# Patient Record
Sex: MALE | Race: WHITE | NOT HISPANIC OR LATINO
[De-identification: names, ages, dates, MRNs, and addresses within clinical notes are randomized per-mention and may not be internally consistent; named-entity substitution may affect disease eponyms.]

---

## 2019-03-10 ENCOUNTER — TRANSCRIPTION ENCOUNTER (OUTPATIENT)
Age: 24
End: 2019-03-10

## 2019-07-13 ENCOUNTER — EMERGENCY (EMERGENCY)
Facility: HOSPITAL | Age: 24
LOS: 1 days | Discharge: HOME | End: 2019-07-13
Attending: EMERGENCY MEDICINE
Payer: COMMERCIAL

## 2019-07-13 VITALS
TEMPERATURE: 98 F | OXYGEN SATURATION: 96 % | DIASTOLIC BLOOD PRESSURE: 80 MMHG | RESPIRATION RATE: 18 BRPM | SYSTOLIC BLOOD PRESSURE: 168 MMHG | HEART RATE: 93 BPM | WEIGHT: 240.08 LBS | HEIGHT: 75 IN

## 2019-07-13 VITALS
HEART RATE: 98 BPM | SYSTOLIC BLOOD PRESSURE: 148 MMHG | DIASTOLIC BLOOD PRESSURE: 82 MMHG | RESPIRATION RATE: 16 BRPM | OXYGEN SATURATION: 100 %

## 2019-07-13 PROCEDURE — 73630 X-RAY EXAM OF FOOT: CPT

## 2019-07-13 PROCEDURE — 73610 X-RAY EXAM OF ANKLE: CPT

## 2019-07-13 PROCEDURE — 99283 EMERGENCY DEPT VISIT LOW MDM: CPT

## 2019-07-13 PROCEDURE — 99284 EMERGENCY DEPT VISIT MOD MDM: CPT

## 2019-07-13 RX ORDER — IBUPROFEN 200 MG
600 TABLET ORAL ONCE
Refills: 0 | Status: COMPLETED | OUTPATIENT
Start: 2019-07-13 | End: 2019-07-13

## 2019-07-13 RX ORDER — ACETAMINOPHEN 500 MG
975 TABLET ORAL ONCE
Refills: 0 | Status: COMPLETED | OUTPATIENT
Start: 2019-07-13 | End: 2019-07-13

## 2019-07-13 RX ADMIN — Medication 975 MILLIGRAM(S): at 23:45

## 2019-07-13 RX ADMIN — Medication 600 MILLIGRAM(S): at 23:44

## 2019-07-13 NOTE — ED ADULT NURSE NOTE - OBJECTIVE STATEMENT
24 y.o M presents to the ED from home c/o R ankle pain. Patient is awake, alert and speaking coherently. As per patient he was at a BBQ; states he was drinking and slipped on some wet grass on a downward slope. Patient states " I heard 4 pops and I was concerned because I broke this ankle 2x in the past." R ankle mildly swollen and slightly tender to palpation, endorses mild tingling; + pedal pulses. No bruising/bleeding/abrasions. Patient ambulatory with slight limp.

## 2019-07-13 NOTE — ED PROVIDER NOTE - ATTENDING CONTRIBUTION TO CARE
24y M here with inversion injury to R ankle when slipped on wet grass. No other injuries. On exam periph NV intact. FROM R hip and knee. Dec ROM R ankle 2/2 pain. Ambulates w limp. Palp pulses. Ecchymosis and swelling to anterior aspect R lateral malleolus and dorsum foot anterior to lateral malleolus. Cap refill <2 sec. No prox tib/fib ttp. Xrays to eval ankle fracture vs sprain. Splint, crutches, pain control, op fu.

## 2019-07-13 NOTE — ED PROVIDER NOTE - NSFOLLOWUPINSTRUCTIONS_ED_ALL_ED_FT
Ice to pain area.  Elevate the affected foot.  Keep the air cast.  Use crutches for ambulation.   Take Motrin (Advil or Ibuprofen) 600mg every 8hours for pain with food.  Tylenol 500mg or 650mg every 6hours for pain as needed.  Follow up with your orthopedist or sports medicine clinic, [phone], call Monday for an appointment.  Return for any concerns or worsening symptoms.

## 2019-07-13 NOTE — ED ADULT NURSE NOTE - INTERVENTIONS DEFINITIONS
Stretcher in lowest position, wheels locked, appropriate side rails in place/Monitor gait and stability/[place] to call system/Instruct patient to call for assistance/Non-slip footwear when patient is off stretcher/Physically safe environment: no spills, clutter or unnecessary equipment/Monitor for mental status changes and reorient to person, place, and time/Provide visual cue, wrist band, yellow gown, etc./Call bell, personal items and telephone within reach

## 2019-07-13 NOTE — ED PROVIDER NOTE - OBJECTIVE STATEMENT
25yo male pt, no PMHx, limping c/o right ankle pain s/p inversion injury this evening. Pt stated he slipped on grass and fell with inverted right ankle. Denies LOC or other injuries. Denies headache, dizziness or N/V. Denies CP/SOB/ ABD pain or pelvic/ hip pain. Denies sensory changes or weakness to extremities. Denies fever, chills or recent sickness.

## 2019-07-13 NOTE — ED PROVIDER NOTE - PHYSICAL EXAMINATION
NAD, Hypertensive, Afebrile, No spinal tender. Lungs clear. + right ankle lat mal and lateral  with mild swelling. Diminished ROMs of right ankle. N/V- intact. No proximal tib/ fin tender.

## 2019-07-13 NOTE — ED PROVIDER NOTE - NSFOLLOWUPCLINICS_GEN_ALL_ED_FT
[facility]  Sports Medicine  [address] [place] [address]  Phone: [phone]  Fax:   Follow Up Time:

## 2019-07-14 PROCEDURE — 73610 X-RAY EXAM OF ANKLE: CPT | Mod: 26,RT

## 2019-07-14 PROCEDURE — 73630 X-RAY EXAM OF FOOT: CPT | Mod: 26,RT

## 2019-07-14 NOTE — ED PROCEDURE NOTE - NS ED PERI VASCULAR NEG
capillary refill time < 2 seconds/no cyanosis of extremity/fingers/toes warm to touch/no swelling/no paresthesia

## 2022-10-22 ENCOUNTER — EMERGENCY (EMERGENCY)
Facility: HOSPITAL | Age: 27
LOS: 1 days | Discharge: HOME | End: 2022-10-22
Attending: EMERGENCY MEDICINE
Payer: COMMERCIAL

## 2022-10-22 VITALS
TEMPERATURE: 97 F | HEIGHT: 75 IN | DIASTOLIC BLOOD PRESSURE: 90 MMHG | WEIGHT: 250 LBS | OXYGEN SATURATION: 99 % | RESPIRATION RATE: 18 BRPM | HEART RATE: 77 BPM | SYSTOLIC BLOOD PRESSURE: 165 MMHG

## 2022-10-22 PROCEDURE — 99284 EMERGENCY DEPT VISIT MOD MDM: CPT

## 2022-10-22 PROCEDURE — 72131 CT LUMBAR SPINE W/O DYE: CPT | Mod: 26,MA

## 2022-10-22 PROCEDURE — 99284 EMERGENCY DEPT VISIT MOD MDM: CPT | Mod: 25

## 2022-10-22 PROCEDURE — 96375 TX/PRO/DX INJ NEW DRUG ADDON: CPT

## 2022-10-22 PROCEDURE — 72131 CT LUMBAR SPINE W/O DYE: CPT | Mod: MA

## 2022-10-22 PROCEDURE — 96374 THER/PROPH/DIAG INJ IV PUSH: CPT

## 2022-10-22 RX ORDER — HYDROMORPHONE HYDROCHLORIDE 2 MG/ML
0.5 INJECTION INTRAMUSCULAR; INTRAVENOUS; SUBCUTANEOUS ONCE
Refills: 0 | Status: DISCONTINUED | OUTPATIENT
Start: 2022-10-22 | End: 2022-10-22

## 2022-10-22 RX ORDER — METHOCARBAMOL 500 MG/1
1000 TABLET, FILM COATED ORAL ONCE
Refills: 0 | Status: COMPLETED | OUTPATIENT
Start: 2022-10-22 | End: 2022-10-22

## 2022-10-22 RX ORDER — METHOCARBAMOL 500 MG/1
1 TABLET, FILM COATED ORAL
Qty: 12 | Refills: 0
Start: 2022-10-22 | End: 2022-10-25

## 2022-10-22 RX ORDER — KETOROLAC TROMETHAMINE 30 MG/ML
30 SYRINGE (ML) INJECTION ONCE
Refills: 0 | Status: DISCONTINUED | OUTPATIENT
Start: 2022-10-22 | End: 2022-10-22

## 2022-10-22 RX ORDER — DEXAMETHASONE 0.5 MG/5ML
10 ELIXIR ORAL ONCE
Refills: 0 | Status: COMPLETED | OUTPATIENT
Start: 2022-10-22 | End: 2022-10-22

## 2022-10-22 RX ADMIN — HYDROMORPHONE HYDROCHLORIDE 0.5 MILLIGRAM(S): 2 INJECTION INTRAMUSCULAR; INTRAVENOUS; SUBCUTANEOUS at 15:47

## 2022-10-22 RX ADMIN — Medication 10 MILLIGRAM(S): at 15:47

## 2022-10-22 RX ADMIN — Medication 30 MILLIGRAM(S): at 15:47

## 2022-10-22 RX ADMIN — METHOCARBAMOL 1000 MILLIGRAM(S): 500 TABLET, FILM COATED ORAL at 16:49

## 2022-10-22 NOTE — ED PROVIDER NOTE - PROGRESS NOTE DETAILS
Pt CT negative for cute fracture. Pt with L2-L3 Mild Disk Bulging , L3-L4 Mild Disk Bulge  flatten Sac. L4-L5 Facet arthritis , L5-S1 Slight retrolisthesis . Pt D/ home with pain medication and will F/U with spine clinic as discussed

## 2022-10-22 NOTE — ED PROVIDER NOTE - NS ED ATTENDING STATEMENT MOD
This was a shared visit with the WANG. I reviewed and verified the documentation and independently performed the documented:

## 2022-10-22 NOTE — ED ADULT NURSE NOTE - OBJECTIVE STATEMENT
Patient is alert and oriented X4 from home with c/o low back pain & numbness to both legs. Patient states he was moving boxes this morning.

## 2022-10-22 NOTE — ED PROVIDER NOTE - PATIENT PORTAL LINK FT
You can access the FollowMyHealth Patient Portal offered by [facility] by registering at the following website: [email or web address]. By joining [employer]’s FollowMyHealth portal, you will also be able to view your health information using other applications (apps) compatible with our system.

## 2022-10-22 NOTE — ED PROVIDER NOTE - NSFOLLOWUPCLINICS_GEN_ALL_ED_FT
[facility]  Ortho/Spine  [address] [place] [id number]  Phone: [phone]  Fax:   Follow Up Time: 7-10 Days

## 2022-10-22 NOTE — ED PROVIDER NOTE - CLINICAL SUMMARY MEDICAL DECISION MAKING FREE TEXT BOX
27 yr old M presented to ED with lower back pain radiating to both lower extremity. Pt also states that he has lower extremity weakness and numbness. Pt admits to moving some boxes this morning prior to the on set of his symptoms. Pt denies any falls or trauma to his lower back.

## 2022-10-22 NOTE — ED PROVIDER NOTE - OBJECTIVE STATEMENT
27 yr old M presented to ED with lower back pain radiating to both lower extremity. Pt also states that he has lower extremity weakness and numbness. Pt admits to moving some boxes this morning prior to the on set of his symptoms. Pt denies any falls or trauma to his lower back. Pt denies any fecal or urinary incontinence. Pt sates that 2 year ago he had Lumbar disc herniation( L2-L3, L4-L5, L5-S1) and  ligament tear. Pt denies any other issues at this time. 27 year old M presented to ED with lower back pain radiating to both lower extremity. Pt also states that he has lower extremity weakness and numbness. Pt admits to moving some boxes this morning prior to the on set of his symptoms. Pt denies any falls or trauma to his lower back. Pt denies any fecal or urinary incontinence. Pt sates that 2 year ago he had Lumbar disc herniation( L2-L3, L4-L5, L5-S1) and  ligament tear. Pt denies any other issues at this time.

## 2022-10-27 ENCOUNTER — APPOINTMENT (OUTPATIENT)
Dept: ORTHOPEDIC SURGERY | Facility: CLINIC | Age: 27
End: 2022-10-27

## 2022-10-27 VITALS
WEIGHT: 250 LBS | DIASTOLIC BLOOD PRESSURE: 101 MMHG | HEIGHT: 75 IN | BODY MASS INDEX: 31.08 KG/M2 | SYSTOLIC BLOOD PRESSURE: 163 MMHG | HEART RATE: 93 BPM

## 2022-10-27 DIAGNOSIS — M54.50 LOW BACK PAIN, UNSPECIFIED: ICD-10-CM

## 2022-10-27 PROBLEM — Z78.9 OTHER SPECIFIED HEALTH STATUS: Chronic | Status: ACTIVE | Noted: 2022-10-22

## 2022-10-27 PROBLEM — Z00.00 ENCOUNTER FOR PREVENTIVE HEALTH EXAMINATION: Status: ACTIVE | Noted: 2022-10-27

## 2022-10-27 PROCEDURE — 99203 OFFICE O/P NEW LOW 30 MIN: CPT

## 2022-10-27 RX ORDER — METHYLPREDNISOLONE 4 MG/1
4 TABLET ORAL
Qty: 1 | Refills: 0 | Status: ACTIVE | COMMUNITY
Start: 2022-10-27 | End: 1900-01-01

## 2022-10-27 NOTE — DISCUSSION/SUMMARY
[de-identified] : 30 minutes was spent reviewing the x-rays as well as discussing with the patient their clinical presentation, diagnosis and providing education.  The patient reports of acute on chronic lower back pain.  The current episode has been present for less than 1 week.  He is improved from the initial episode on Saturday.  He does have a history of ongoing lower back pain and is treated by pain management.  He has had numerous epidural injections.  Last set of injections was about 3 months ago.  At this time the patient is recommended a Medrol Dosepak to continue his improvement.  He will continue the use of the over-the-counter and prescribed anti-inflammatories.  He is recommended to avoid narcotic pain medicine.  He is prescribed physical therapy.  He is recommended to adopt a home exercise program.  It is anticipated he will continue to improve.  If he does not have near resolution of his symptoms or improved to a satisfactory degree, he will follow-up with his pain management specialist in approximately 10 to 14 days.  He will slowly increase activities.  All questions were answered to the patient's satisfaction.

## 2022-10-27 NOTE — PHYSICAL EXAM
[de-identified] : The patient is conversive and in no apparent distress. The patient is alert and oriented to person, place, and time. Affect and mood appear normal. The head is normocephalic and atraumatic. Skin shows normal turgor with no evidence of eczema or psoriasis. No respiratory distress noted. Sensation grossly intact. MUSCULOSKELETAL:   SEE BELOW\par \par Lumbar spine exam demonstrates no midline tenderness.  There is mild left-sided paravertebral lower lumbar tenderness.  There is no tenderness of the hip, knee or foot.  Sensation is intact to light touch with mildly diminished left lower extremity touch sensation.  4+/5 motor strength against resisted hip, knee and foot motor function.  No weakness or foot drop appreciated.  No clonus.  Gait demonstrates a mild antalgic limp.  The patient does have discomfort when going to sit on the exam table.  No difficulties exiting the table. [de-identified] : CAT scan from [facility] emergency department on October 22, 2022 was reviewed.  No disc bulge from T12-L2.  Mild disc bulge from L2-L4.  No significant foraminal stenosis or degenerative changes appreciated.  L4-L5 demonstrates a broad-based disc bulge.  Mild bilateral facet arthritis.  L5-S1 demonstrates mild bilateral facet arthritis as well.  No fractures are appreciated throughout the spine.

## 2022-10-27 NOTE — HISTORY OF PRESENT ILLNESS
[de-identified] : Patient presents today for evaluation of acute lower back pain.  He has had the back pain for less than 1 week.  He states he was moving some boxes weighing less than 10 pounds when he suddenly felt this sharp increase in back pain.  The patient does report of ongoing back pain for over 2 years now.  He is been seen by his pain management locally.  He has had between 3 and 4 epidural injections in the lower part of the spine.  Last set of injections was about 3 months ago.  He states on Saturday when the pain was at its worse he did have numbness down the entire left lower leg.  He reports he is improved from the onset however continues to have some discomfort.  He was brought to the emergency room.  He was given IV medication.  He was also taken for CAT scan.  He was prescribed home anti-inflammatories, muscle relaxer and some narcotic pain medication.  He states he has not been using the narcotic pain medication.  He is currently driving at this point he is back to work.  He is not using a brace, cane or walker.\par \par Review of Systems-\par Constitutional: No fever or chills. \par Cardiovascular: No orthopnea or chest pain\par Pulmonary: No shortness of breath. \par GI: No nausea or vomiting or abdominal pain.\par Musculoskeletal: see HPI \par Psychiatric: No anxiety and depression.